# Patient Record
(demographics unavailable — no encounter records)

---

## 2025-04-07 NOTE — HISTORY OF PRESENT ILLNESS
[de-identified] : Patient is here for his left shoulder his chief complaint comes in a wheelchair from [facility] with his cousin right-hand-dominant  Physical exam left shoulder 0 to 70 degrees forward flexion and abduction pain with extreme endpoints  X-rays showing severe arthritis of the glenohumeral joint left shoulder X-rays of the neck show arthritis mid lower cervical spine with disc degeneration  Diagnosis severe arthritis left shoulder glenohumeral joint  Patient is actually comfortable at rest it is just when he gets to 70 to 75 degrees he has shoulder pain and loss of motion due to his compromised kidney function we will hold off on anti-inflammatories and just treating with Tylenol if cleared by his medical doctor for that therapy may aggravate the shoulder pain, recommend he weight-bear as tolerated on the left shoulder extremity, would not recommend shoulder surgery since he is minimally symptomatic and the only shoulder surgery that would work would be a shoulder replacement  Since he has difficulty ambulating I will have him come back to evaluate the knees and hips to see if we can get a better ambulation status he also has a very stiff left hand and digits and have him see our hand specialist for that

## 2025-05-02 NOTE — DISCUSSION/SUMMARY
[de-identified] : Bilateral knee pain  HPI Patient is an 82-year-old male accompanied by his son reports to the office for subsequent reevaluation of his bilateral knee pain.  He had cortisone and Euflexxa gel injections done to his knee several years ago.  His knee pain has been acting up recently.  Denies any trauma or injury to the area.  Patient is nonambulatory in a wheelchair.  Range of motion and palpating certain areas of the knee aggravate the patient's pain.  Denies any numbness or tingling.  Bilateral knee x-ray taken in office today revealed no obvious fractures, subluxations, dislocations.  Joint space narrowing with severe degenerative/arthritic changes noted.  "Bone-on-bone" consistency.  Otherwise, no other significant abnormalities were seen.  Bilateral knee exam is as follows: Minimal effusion noted.  No erythema or ecchymosis.  Able to perform active straight leg raise.  Knee flexion from 5 to 90 degrees with stiffness and pain.  Patellofemoral, medial/lateral joint line tenderness to palpation.  Calf is soft and nontender.  Light touch intact throughout.  Nonambulatory in wheelchair  Assessment/plan Explained x-ray findings.  Explained to patient that he may be a candidate for knee replacement surgery.  Will hold off on injections for now.  A script for physical therapy was printed for the patient so they can get started on that.  OTC Tylenol as needed for pain.  Follow-up with Dr. Desnon next available appointment for possible surgical consultation.  All question/concerns were answered in detail.

## 2025-05-09 NOTE — HISTORY OF PRESENT ILLNESS
[de-identified] : The patient is an 82-year-old male right-hand-dominant here for evaluation of his left hand and his right elbow.  He has been having intermittent pain in the left hand for 1 month.  No trauma to the area.  Regarding her right elbow he has been having intermittent pain in the right elbow for the past 3 months, no trauma to the area.  He is a resident at [facility].  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  On my review of the mild degenerative changes of the PIP joints of the index and middle fingers.

## 2025-05-09 NOTE — DISCUSSION/SUMMARY
[de-identified] : I reviewed the x-ray findings with the patient.  I recommend formal therapy for the left hand, Rx provided for that.  He can use Tylenol for pain, since he has compromised kidney function.  He has he will follow-up in 6 weeks for further evaluation with Dr. Mclaughlin.

## 2025-05-09 NOTE — IMAGING
[de-identified] : Physical exam of the right elbow: Full range of motion without pain.  No tenderness to palpation over the medial or lateral epicondyles.  Negative hook test.  No tenderness to palpation of the olecranon.  Intact to light touch.  Physical exam of the left hand: Mild edema noted of the 2nd and 3rd digits.  Decreased range of motion with flexion of the PIP joints of the digits of the left hand.  Full extension.  No tenderness to palpation over the metacarpals.  Mild tenderness to palpation over the PIP joints of the index and middle fingers.  Intact to light touch.

## 2025-05-09 NOTE — DATA REVIEWED
[Right] : of the right [Elbow] : elbow [Left] : left [Hand] : hand [FreeTextEntry1] : 3 views of the right elbow were obtained here in the office today and show: No fracture or dislocation. [FreeTextEntry2] : 3 views of the left hand were obtained in the office today and show: No fracture or dislocation.  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

## 2025-06-05 NOTE — HISTORY OF PRESENT ILLNESS
[de-identified] : 82 M resident at [place] was transferred to there a year ago after a fall and has been declining since now basically unable to walk or even get out of a wheelchair has severe bl knee oa has abrasions all over knees as well from scratching  Imaging: X-rays were reviewed with the patient.   AP and Lateral views were obtained of the knees, including the contralateral side for comparison purposes. X-rays are negative for acute bone or soft tissue trauma. Severe degenerative changes noted including osteophytes, bone to bone apposition, sclerotic and cystic changes.  Grade 4 Kellgren Yair changes throughout.  at this time too deconditioned to undergo tka